# Patient Record
Sex: MALE | Race: WHITE | NOT HISPANIC OR LATINO | Employment: OTHER | URBAN - METROPOLITAN AREA
[De-identification: names, ages, dates, MRNs, and addresses within clinical notes are randomized per-mention and may not be internally consistent; named-entity substitution may affect disease eponyms.]

---

## 2022-08-04 ENCOUNTER — TELEPHONE (OUTPATIENT)
Dept: UROLOGY | Facility: CLINIC | Age: 67
End: 2022-08-04
Payer: COMMERCIAL

## 2022-08-04 ENCOUNTER — TELEPHONE (OUTPATIENT)
Dept: HEMATOLOGY/ONCOLOGY | Facility: CLINIC | Age: 67
End: 2022-08-04
Payer: COMMERCIAL

## 2022-08-04 NOTE — TELEPHONE ENCOUNTER
RN Rush restricted from scheduling on Dr Rockwell's schedule, Message sent to staff for assistance      ----- Message from Diallo Telles RN sent at 8/4/2022 11:20 AM CDT -----  Regarding: FW: Appt  Contact: pt @ [phone]  I think you're Crista this week.  Let me know if I need to send this somewhere else.  Thanks  ----- Message -----  From: Flavio Dillard LPN  Sent: 8/4/2022   9:46 AM CDT  To: Diallo Telles RN  Subject: FW: Eliz                                           ----- Message -----  From: Ariane Pfeiffer  Sent: 8/4/2022   9:11 AM CDT  To: [facility] Staff  Subject: Appt                                             Pt was diagnosed with prostrate cancer, in need of an urgent appt. Asking for a call back asap

## 2022-08-04 NOTE — TELEPHONE ENCOUNTER
----- Message from Karen Borrero RN sent at 8/4/2022  1:20 PM CDT -----  Regarding: FW: Appt  Contact: pt @ [phone]  [facility] staff,    New patient seeking 2nd opinion with Dr Rockwell, possible discussion for [facility]. Unfortunately, I am unable to lift the restrictions on Dr Rockwell's schedule and [facility] is blocking me from making an appointment. Would anyone be able to assist, please?   Im looking at Aug 23 and Aug 24    Patient hoping to hear back today    Thank you,  Karen bone marrow RN Rush  ----- Message -----  From: Diallo Telles RN  Sent: 8/4/2022  11:21 AM CDT  To: Karen Borrero RN  Subject: FW: Appt                                         I think you're Crista this week.  Let me know if I need to send this somewhere else.  Thanks  ----- Message -----  From: Flavio Dillard LPN  Sent: 8/4/2022   9:46 AM CDT  To: Diallo Telles RN  Subject: FW: Appt                                           ----- Message -----  From: Ariane Pfeiffer  Sent: 8/4/2022   9:11 AM CDT  To: [facility] Staff  Subject: Appt                                             Pt was diagnosed with prostrate cancer, in need of an urgent appt. Asking for a call back asap

## 2022-08-04 NOTE — TELEPHONE ENCOUNTER
Attempted to contact left message with date and time for appointment.  Clinic number given for any questions.  maryjo hall lpn

## 2022-08-23 ENCOUNTER — TELEPHONE (OUTPATIENT)
Dept: RADIOLOGY | Facility: HOSPITAL | Age: 67
End: 2022-08-23
Payer: COMMERCIAL

## 2022-08-23 ENCOUNTER — OFFICE VISIT (OUTPATIENT)
Dept: UROLOGY | Facility: CLINIC | Age: 67
End: 2022-08-23
Payer: MEDICARE

## 2022-08-23 VITALS
WEIGHT: 167.75 LBS | DIASTOLIC BLOOD PRESSURE: 80 MMHG | HEIGHT: 71 IN | BODY MASS INDEX: 23.48 KG/M2 | SYSTOLIC BLOOD PRESSURE: 139 MMHG | HEART RATE: 66 BPM

## 2022-08-23 DIAGNOSIS — C61 PROSTATE CANCER: Primary | ICD-10-CM

## 2022-08-23 PROCEDURE — 99999 PR PBB SHADOW E&M-EST. PATIENT-LVL III: ICD-10-PCS | Mod: PBBFAC,,, | Performed by: UROLOGY

## 2022-08-23 PROCEDURE — 99205 PR OFFICE/OUTPT VISIT, NEW, LEVL V, 60-74 MIN: ICD-10-PCS | Mod: S$PBB,,, | Performed by: UROLOGY

## 2022-08-23 PROCEDURE — 99213 OFFICE O/P EST LOW 20 MIN: CPT | Mod: PBBFAC | Performed by: UROLOGY

## 2022-08-23 PROCEDURE — 99205 OFFICE O/P NEW HI 60 MIN: CPT | Mod: S$PBB,,, | Performed by: UROLOGY

## 2022-08-23 PROCEDURE — 99999 PR PBB SHADOW E&M-EST. PATIENT-LVL III: CPT | Mod: PBBFAC,,, | Performed by: UROLOGY

## 2022-08-23 RX ORDER — OMEPRAZOLE 40 MG/1
40 CAPSULE, DELAYED RELEASE ORAL
COMMUNITY
Start: 2022-08-12 | End: 2022-08-23

## 2022-08-23 RX ORDER — OMEPRAZOLE 40 MG/1
40 CAPSULE, DELAYED RELEASE ORAL DAILY
COMMUNITY
Start: 2022-08-12

## 2022-08-23 RX ORDER — CETIRIZINE HYDROCHLORIDE 10 MG/1
10 TABLET ORAL
COMMUNITY

## 2022-08-23 RX ORDER — CIPROFLOXACIN 500 MG/1
500 TABLET ORAL 2 TIMES DAILY
COMMUNITY
Start: 2022-03-08

## 2022-08-23 NOTE — LETTER
August 23, 2022        [place], [place]  [id number] [place] [address] Dr Ness [address] [address]             [facility] - Urology 2nd Fl  [address]  [facility] [address]  Phone: [phone]   Patient: Bebeto Lou   MR Number: [id number]   YOB: 1955   Date of Visit: 8/23/2022       Dear Dr. Husain:    Thank you for referring Bebeto Lou to me for evaluation. Attached you will find relevant portions of my assessment and plan of care.    If you have questions, please do not hesitate to call me. I look forward to following Bebeto Lou along with you.    Sincerely,      Wencselao Rockwell MD            CC    No Recipients    Enclosure

## 2022-08-23 NOTE — PROGRESS NOTES
Clinic Note  8/23/2022      Subjective:         Chief Complaint:   HPI  Bebeto Lou is a 67 y.o. male  Diagnosed with prostate cancer.  Here with wife Jenny, friend [place]. Residential contractor. 2 boys, 2 girls...    FH -negative  PSA - 7.55  AJCC Stage - T3b  STAR CAP stage- III3A  Volume - 36 ccs  MRI - 6/14/2022- 2.1 cm PI-RADS 5 lesion LBPZ. Positive for left SVI (seminal vesicle involvement) , left extra prostatic extension,and left NVBI (neurovascular bundle involvement). Negative nodes.Report only.  Biopsy - 7/22/2022- [place] 4+3 (GG3) left middle(target) 4/4 30%, left base 30%; [place] 6 left lateral apex 10%, left lateral mid 30%, left lateral base. Overall 5/13 sites  JAY Score - 7 high risk  NCCN - high risk  Germline testing -indicated  Somatic testing -       No results found for: PSA, PSADIAG, PSATOTAL, PSAFREE, PSAFREEPCT   No past medical history on file.  No family history on file.  Social History     Socioeconomic History    Marital status:      No past surgical history on file.  Patient Active Problem List   Diagnosis    Prostate cancer     Review of Systems   Constitutional: Negative for appetite change, chills, fatigue, fever and unexpected weight change.   HENT: Negative for nosebleeds.    Respiratory: Negative for shortness of breath and wheezing.    Cardiovascular: Negative for chest pain, palpitations and leg swelling.   Gastrointestinal: Negative for abdominal distention, abdominal pain, constipation, diarrhea, nausea and vomiting.   Musculoskeletal: Negative for arthralgias and back pain.   Skin: Negative for pallor.   Neurological: Negative for dizziness, seizures and syncope.   Hematological: Negative for adenopathy.   Psychiatric/Behavioral: Negative for dysphoric mood.         Objective:      There were no vitals taken for this visit.  There is no height or weight on file to calculate BMI.  Physical Exam  Constitutional:       General: He is not in acute distress.      Appearance: He is well-developed. He is not diaphoretic.   HENT:      Head: Atraumatic.   Neck:      Trachea: No tracheal deviation.   Cardiovascular:      Rate and Rhythm: Normal rate.   Pulmonary:      Effort: Pulmonary effort is normal. No respiratory distress.      Breath sounds: No wheezing.   Abdominal:      General: Bowel sounds are normal. There is no distension.      Palpations: Abdomen is soft. There is no mass.      Tenderness: There is no abdominal tenderness. There is no guarding or rebound.   Skin:     General: Skin is warm and dry.   Neurological:      Mental Status: He is alert and oriented to person, place, and time.   Psychiatric:         Behavior: Behavior normal.         Thought Content: Thought content normal.         Judgment: Judgment normal.           Assessment and Plan:           Problem List Items Addressed This Visit     Prostate cancer - Primary          Follow up:   Today's visit was spent almost entirely on counseling. We reviewed his diagnosis, stage, grade, risk group, and prognosis. We discussed [place] (NCCN) and JAY risk stratification  We discussed the concept of low risk, intermediate risk, and high risk disease. We also reviewed the NCCN treatment nomogram.We discussed the different treatment options including active surveillance (as well as the surveillance protocol), prostate brachytherapy, EBRT, SBRT (stereotactic body radiation therapy),cryotherapy, HIFU and both open and robotic prostatectomy.We also discussed the advantages, disadvantages, risks and benefits, as well as complications of each option. Regarding radiation therapy we discussed treatment planning, the different techniques, short and long term complications. These included radiation cystitis, radiation proctitis, and impotence. We discussed success, failure, and salvage therapeutic options.Also discussed the use of SpaceOAR for brachytherapy and EBRT and fiducials for EBRT.   We discussed surgical therapy in  depth including preoperative preparation, surgical technique (including bladder neck and nerve-sparing techniques), postoperative recuperation and recovery, and short and long term complications including UTI, bleeding, blood clots,catheter dislodgement, etc. We discussed the risks of reoperation, incontinence, impotence, and recurrence. We discussed preop and postop Kegels, post op penile rehab, and treatment options for incontinence and impotence. We discussed rates of cancer free survival and recurrence, as well as salvage therapeutic options. We discussed the possible  indications for adjuvant radiation therapy.   I answered questions and addressed concerns. Also discussed the [employer] test to get genetic information about this tumors potential    Discussed that with high risk tumor germline testing and additional staging are indicated. Recommend PSMA scan for staging.  PSMA scan  Yannick Bernal consult  [employer].  Consult Dr. Husain.  Wenceslao Rockwell

## 2022-08-24 ENCOUNTER — TELEPHONE (OUTPATIENT)
Dept: RADIOLOGY | Facility: HOSPITAL | Age: 67
End: 2022-08-24
Payer: COMMERCIAL

## 2022-08-24 ENCOUNTER — DOCUMENTATION ONLY (OUTPATIENT)
Dept: GENETICS | Facility: CLINIC | Age: 67
End: 2022-08-24
Payer: COMMERCIAL

## 2022-08-24 NOTE — PROGRESS NOTES
Genetic Referral Nurse Note    Nurse reached out to pt to set up genetic apt, pt was aware of referral, apt scheduled per pt date/time/location. Pt stated if he can not keep the apt, he will call to r/s

## 2022-08-30 ENCOUNTER — HOSPITAL ENCOUNTER (OUTPATIENT)
Dept: RADIOLOGY | Facility: HOSPITAL | Age: 67
Discharge: HOME | End: 2022-08-30
Attending: UROLOGY
Payer: MEDICARE

## 2022-08-30 DIAGNOSIS — C61 PROSTATE CANCER: ICD-10-CM

## 2022-08-30 PROCEDURE — 78815 NM PET CT F 18 PYL PSMA, MIDTHIGH TO VERTEX: ICD-10-PCS | Mod: 26,PI,, | Performed by: RADIOLOGY

## 2022-08-30 PROCEDURE — 78815 PET IMAGE W/CT SKULL-THIGH: CPT | Mod: TC

## 2022-08-30 PROCEDURE — 78815 PET IMAGE W/CT SKULL-THIGH: CPT | Mod: 26,PI,, | Performed by: RADIOLOGY

## 2022-09-01 ENCOUNTER — OFFICE VISIT (OUTPATIENT)
Dept: UROLOGY | Facility: CLINIC | Age: 67
End: 2022-09-01
Payer: MEDICARE

## 2022-09-01 VITALS
SYSTOLIC BLOOD PRESSURE: 153 MMHG | BODY MASS INDEX: 25.14 KG/M2 | HEART RATE: 68 BPM | HEIGHT: 71 IN | DIASTOLIC BLOOD PRESSURE: 78 MMHG | WEIGHT: 179.56 LBS

## 2022-09-01 DIAGNOSIS — C61 PROSTATE CANCER: Primary | ICD-10-CM

## 2022-09-01 PROCEDURE — 99999 PR PBB SHADOW E&M-EST. PATIENT-LVL III: ICD-10-PCS | Mod: PBBFAC,,, | Performed by: UROLOGY

## 2022-09-01 PROCEDURE — 99999 PR PBB SHADOW E&M-EST. PATIENT-LVL III: CPT | Mod: PBBFAC,,, | Performed by: UROLOGY

## 2022-09-01 PROCEDURE — 99215 OFFICE O/P EST HI 40 MIN: CPT | Mod: S$PBB,,, | Performed by: UROLOGY

## 2022-09-01 PROCEDURE — 99215 PR OFFICE/OUTPT VISIT, EST, LEVL V, 40-54 MIN: ICD-10-PCS | Mod: S$PBB,,, | Performed by: UROLOGY

## 2022-09-01 PROCEDURE — 99213 OFFICE O/P EST LOW 20 MIN: CPT | Mod: PBBFAC | Performed by: UROLOGY

## 2022-09-01 NOTE — PROGRESS NOTES
"Clinic Note  9/1/2022      Subjective:         Chief Complaint:   SAE West is a 67 y.o. maleDiagnosed with prostate cancer.  Here with wife Jenny, friend [place]. Residential contractor. 2 boys, 2 girls...     FH -negative  PSA - 7.55  AJCC Stage - T3b  STAR CAP stage- III3A  Volume - 36 ccs  MRI - 6/14/2022- 2.1 cm PI-RADS 5 lesion LBPZ. Positive for left SVI (seminal vesicle involvement) , left extra prostatic extension,and left NVBI (neurovascular bundle involvement). Negative nodes.Report only.  Biopsy - 7/22/2022- [place] 4+3 (GG3) left middle(target) 4/4 30%, left base 30%; [place] 6 left lateral apex 10%, left lateral mid 30%, left lateral base. Overall 5/13 sites  JAY Score - 7 high risk  NCCN - high risk. Very high risk per MRI.   Germline testing -indicated  Somatic testing -     [facility] [place] involvement: 40%    9/1/2022- PSMA scan 8/30/2022- No avid metastatic or alexia lesions. Scans shows uptake on left side of prostate and involvement of left seminal vesicle.  Has appointment with Dr. Husain tomorrow.  PSMA-        No results found for: PSA, PSADIAG, PSATOTAL, PSAFREE, PSAFREEPCT   Past Medical History:   Diagnosis Date    Allergy      No family history on file.  Social History     Socioeconomic History    Marital status:    Tobacco Use    Smoking status: Never    Smokeless tobacco: Never   Substance and Sexual Activity    Alcohol use: Yes    Drug use: Never    Sexual activity: Yes     No past surgical history on file.  Patient Active Problem List   Diagnosis    Prostate cancer     Review of Systems      Objective:      There were no vitals taken for this visit.  Estimated body mass index is 23.4 kg/m² as calculated from the following:    Height as of 8/23/22: 5' 11" (1.803 m).    Weight as of 8/23/22: 76.1 kg (167 lb 12.3 oz).  Physical Exam      Assessment and Plan:           Problem List Items Addressed This Visit       Prostate cancer - Primary       Follow up: "   Today's visit was spent almost entirely on counseling. We reviewed his diagnosis, stage, grade, risk group, and prognosis. We discussed [place] (NCCN) and JAY risk stratification  We discussed the concept of low risk, intermediate risk, and high risk disease. We also reviewed the NCCN treatment nomogram.We discussed the different treatment options including active surveillance (as well as the surveillance protocol), prostate brachytherapy, EBRT, SBRT (stereotactic body radiation therapy),cryotherapy, HIFU and both open and robotic prostatectomy.We also discussed the advantages, disadvantages, risks and benefits, as well as complications of each option. Regarding radiation therapy we discussed treatment planning, the different techniques, short and long term complications. These included radiation cystitis, radiation proctitis, and impotence. We discussed success, failure, and salvage therapeutic options.Also discussed the use of SpaceOAR for brachytherapy and EBRT and fiducials for EBRT.   We discussed surgical therapy in depth including preoperative preparation, surgical technique (including bladder neck and nerve-sparing techniques), postoperative recuperation and recovery, and short and long term complications including UTI, bleeding, blood clots,catheter dislodgement, etc. We discussed the risks of reoperation, incontinence, impotence, and recurrence. We discussed preop and postop Kegels, post op penile rehab, and treatment options for incontinence and impotence. We discussed rates of cancer free survival and recurrence, as well as salvage therapeutic options. We discussed the possible  indications for adjuvant radiation therapy.   I answered questions and addressed concerns. Also discussed the [employer] test to get genetic information about this tumors potential   MRI and PSMA both consistent with T3b disease. Will discuss with Dr Husain.            Wenceslao Rockwell

## 2022-09-01 NOTE — LETTER
September 1, 2022        [place] III, [place]  [id number] [place] [address] Dr Ness [address] [address]             [facility] - Urology 2nd Fl  [address]  [facility] [address]  Phone: [phone]   Patient: Bebeto West   MR Number: [id number]   YOB: 1955   Date of Visit: 9/1/2022       Dear Dr. Husain:    Thank you for referring Bebeto West to me for evaluation. Attached you will find relevant portions of my assessment and plan of care.    If you have questions, please do not hesitate to call me. I look forward to following Bebeto West along with you.    Sincerely,      Wenceslao Rockwell MD            CC    No Recipients    Enclosure

## 2022-09-02 ENCOUNTER — OFFICE VISIT (OUTPATIENT)
Dept: RADIATION ONCOLOGY | Facility: CLINIC | Age: 67
End: 2022-09-02
Payer: MEDICARE

## 2022-09-02 VITALS
OXYGEN SATURATION: 98 % | HEART RATE: 60 BPM | RESPIRATION RATE: 18 BRPM | TEMPERATURE: 97 F | BODY MASS INDEX: 25.13 KG/M2 | DIASTOLIC BLOOD PRESSURE: 81 MMHG | HEIGHT: 71 IN | SYSTOLIC BLOOD PRESSURE: 142 MMHG | WEIGHT: 179.5 LBS

## 2022-09-02 DIAGNOSIS — C61 PROSTATE CANCER: Primary | ICD-10-CM

## 2022-09-02 PROCEDURE — 99417 PROLNG OP E/M EACH 15 MIN: CPT | Mod: S$PBB,,, | Performed by: RADIOLOGY

## 2022-09-02 PROCEDURE — 99999 PR PBB SHADOW E&M-EST. PATIENT-LVL III: ICD-10-PCS | Mod: PBBFAC,,, | Performed by: RADIOLOGY

## 2022-09-02 PROCEDURE — 99213 OFFICE O/P EST LOW 20 MIN: CPT | Mod: PBBFAC | Performed by: RADIOLOGY

## 2022-09-02 PROCEDURE — 99205 OFFICE O/P NEW HI 60 MIN: CPT | Mod: S$PBB,,, | Performed by: RADIOLOGY

## 2022-09-02 PROCEDURE — 99999 PR PBB SHADOW E&M-EST. PATIENT-LVL III: CPT | Mod: PBBFAC,,, | Performed by: RADIOLOGY

## 2022-09-02 PROCEDURE — 99205 PR OFFICE/OUTPT VISIT, NEW, LEVL V, 60-74 MIN: ICD-10-PCS | Mod: S$PBB,,, | Performed by: RADIOLOGY

## 2022-09-02 PROCEDURE — 99417 PR PROLONGED SVC, OUTPT, W/WO DIRECT PT CONTACT,  EA ADDTL 15 MIN: ICD-10-PCS | Mod: S$PBB,,, | Performed by: RADIOLOGY

## 2022-09-02 NOTE — PROGRESS NOTES
[facility] [place]  RADIATION ONCOLOGY CONSULTATION    Name: Bebeto West  : 1955      Patient Referred To Radiation Oncology By:  Dr. Wenceslao Rockwell MD  [address] [place] [address]    DIAGNOSIS: unfavorable intermediate risk prostate cancer, [place] 4+3=7, PSA 9.3, [id number]    HISTORY OF PRESENT ILLNESS:  Bebeto West is a 67 y.o. male who presents for consultation for the above diagnosis.   PSA up to 7.55 recently, was 9.3 nearly one year ago.    Outside MRI 22 showed 36cc gland, PIRADS 5 lesion left side with SVI, KYRIE and NV bundle involvement.     Outside biopsy pathology showed adenocarcinoma, [place] 4+3=7, overall 5/13 cores.    PSMA PET showed nodular focus of avidity in L posterior prostate involving L SV and NV bundle.    Today, he is doing well overall.  He is not taking any bladder medications.  Denies diarrhea, constipation, bloody stools.   Denies dysuria, hematuria.  Strongly considering surgery with Dr. Bai     REVIEW OF SYSTEMS: (Positive findings bold, otherwise negative)   Constitutional: fever, fatigue, weight change  Eyes: blurred vision in the past 3 months, double vision   ENT: ear pain, new mouth lesions, jaw pain, difficulty swallowing, sore throat  Cardiovascular: chest pain on exertion, reflux, leg swelling  Respiratory: shortness of breath, dyspnea, cough, hemoptysis.   GI: abdominal pain, diarrhea, constipation, blood in stool, painful bowel movements  : painful or burning urination, blood in urine  Musculoskeletal: new bone or joint pains  Neurologic: headache, seizure, focal numbness or tingling, balance changes, speech changes  Lymph: new or enlarged lymph nodes  Psychiatric: depression, anxiety    PRIOR RADIATION HISTORY: none    PAST MEDICAL HISTORY:  Past Medical History:   Diagnosis Date    Allergy        PAST SURGICAL HISTORY:  No past surgical history on file.    ALLERGIES:   Review of patient's allergies  "indicates:   Allergen Reactions    Cat hair standardized allergenic extract        MEDICATIONS:    Current Outpatient Medications:     cetirizine (ZYRTEC) 10 MG tablet, Take 10 mg by mouth., Disp: , Rfl:     ciprofloxacin HCl (CIPRO) 500 MG tablet, Take 500 mg by mouth 2 (two) times daily., Disp: , Rfl:     omeprazole (PRILOSEC) 40 MG capsule, Take 40 mg by mouth once daily., Disp: , Rfl:     SOCIAL HISTORY:  Social History     Socioeconomic History    Marital status:    Tobacco Use    Smoking status: Never    Smokeless tobacco: Never   Substance and Sexual Activity    Alcohol use: Yes    Drug use: Never    Sexual activity: Yes     Lives in     FAMILY HISTORY:  No family history on file.    PHYSICAL EXAMINATION:  Constitutional: well appearing, no acute distress, ECOG 0 - Fully Active  Vitals:    BP (!) 142/81   Pulse 60   Temp 97.4 °F (36.3 °C)   Resp 18   Ht 5' 11" (1.803 m)   Wt 81.4 kg (179 lb 8 oz)   SpO2 98%   BMI 25.04 kg/m²   Eyes: sclera anicteric, EOMI, pupils equal, round and reactive to light  ENT: oral cavity without lesions, moist mucous membranes  Neck: trachea midline, neck supple  Lymphatic: no cervical, supraclavicular or axillary adenopathy  Cardiovascular: regular rate, no edema of the upper or lower extremities, radial pulse 2+  Respiratory: unlabored effort, clear to auscultation, no wheezes  Abdomen: soft, non-tender, no rigidity, no masses, no hepatomegaly  Rectal: deferred  Neuro: Cranial nerves III-XII intact, speech not slurred, gait non-ataxic, no dysdiadochokinesia, strength 5/5 upper and lower extremities  Spine: non-tender to percussion cervical, thoracic and lumbosacral spine    IMAGING AND LABORATORY FINDINGS: As per HPI; images reviewed personally.    ASSESSMENT: 67 y.o. male with unfav intermed risk prostate cancer    PLAN: Mr. West has a unfavorable intermediate risk prostate cancer. His treatment options include prostatectomy or radiation therapy with androgen " deprivation therapy.   Surgery for him given MRI findings; would almost certainly require salvage radiation.    For unfavorable intermediate risk prostate cancer, radiation treatment typically consists of hypofractionated (70Gy/28fx) external beam radiation likely with 6 months of ADT.  ADT is given prior to starting radiation and continued adjuvantly.   Could consider Decipher (would have to be ordered by urology given I do not have pathology at [facility]) to determine if he should have longer than 6mo of ADT.    Potential acute toxicities include fatigue, nausea, and bowel/bladder irritation. Potential late toxicities include bowel/bladder irritation, erectile dysfunction, damage to bowel/bladder, and very low risk of secondary malignancy.     After this discussion regarding the techniques, toxicities and indications of radiation, I answered the patient's questions to their apparent satisfaction.     I spent approximately 100 minutes reviewing the available records and evaluating the patient, out of which over 50% of the time was spent face to face with the patient in counseling and coordinating this patient's care.    Shawn Husain III, M.D.  Radiation Oncology  [facility] [address] [place] [address]  Ph: [phone]  [email or web address]

## 2022-09-06 ENCOUNTER — OFFICE VISIT (OUTPATIENT)
Dept: GENETICS | Facility: CLINIC | Age: 67
End: 2022-09-06
Payer: MEDICARE

## 2022-09-06 ENCOUNTER — LAB VISIT (OUTPATIENT)
Dept: LAB | Facility: HOSPITAL | Age: 67
End: 2022-09-06
Attending: UROLOGY
Payer: MEDICARE

## 2022-09-06 DIAGNOSIS — C61 PROSTATE CANCER: ICD-10-CM

## 2022-09-06 LAB
GENETIC COUNSELING?: NORMAL
GENSO SPECIMEN TYPE: NORMAL
PARTENTAL OR SIBLING TESTING?: NORMAL

## 2022-09-06 PROCEDURE — 96040 PR GENETIC COUNSELING, EACH 30 MIN: CPT | Mod: ,,, | Performed by: UROLOGY

## 2022-09-06 PROCEDURE — 36415 COLL VENOUS BLD VENIPUNCTURE: CPT | Performed by: UROLOGY

## 2022-09-06 PROCEDURE — 99999 PR PBB SHADOW E&M-EST. PATIENT-LVL I: CPT | Mod: PBBFAC,,,

## 2022-09-06 PROCEDURE — 99499 NO LOS: ICD-10-PCS | Mod: S$PBB,,, | Performed by: UROLOGY

## 2022-09-06 PROCEDURE — 99999 PR PBB SHADOW E&M-EST. PATIENT-LVL I: ICD-10-PCS | Mod: PBBFAC,,,

## 2022-09-06 PROCEDURE — 99499 UNLISTED E&M SERVICE: CPT | Mod: S$PBB,,, | Performed by: UROLOGY

## 2022-09-06 PROCEDURE — 99211 OFF/OP EST MAY X REQ PHY/QHP: CPT | Mod: PBBFAC

## 2022-09-06 PROCEDURE — 96040 PR GENETIC COUNSELING, EACH 30 MIN: ICD-10-PCS | Mod: ,,, | Performed by: UROLOGY

## 2022-09-06 NOTE — PROGRESS NOTES
"    Cancer Genetics  Hereditary/High Risk Clinic  Department of Hematology and Oncology  [facility]         Date of Service:  2022  Provider:  Anil Mckeon MS, GC    Patient Information  Name:  Bebeto West  :  1955  MRN:  [id number]        Referring Provider: Wenceslao Rockwell MD    SUBJECTIVE:      Chief Complaint: Genetic Counseling    History of Present Illness (HPI):  Bebeto West ("Newsudheer"), 67 y.o., assigned male sex at birth is new to the [facility] of [facility] and to me.  He was referred by  Urology  for genetic cancer risk assessment given his personal history of prostate cancer with a [place] score of 7. At age 67, Christiane was diagnosed with adenocarcinoma of the prostate ([place] 3+4, PSA 7.55, T3b, Stage III3A). Christiane reported that he plans to undergo radiation therapy, no plan for surgery per Christiane.    Focused Medical History:   Germline cancer-genetic testing:  No  Cancer:  Yes - prostate (2022)  Colon polyp:  Yes   Most recent colonoscopy: 6 years ago, told to repeat in 10 years  Other benign tumor:  No  Pancreatitis:  No  Pancreatic cyst:  No    Ancestry:  Ashkenazi [place] ancestry:  No  Paternal: English  Maternal: English, Malay    Focused Family History:  Consanguinity in ancestors:  No  Germline cancer-genetic testing in blood relatives:  No  Cancer in family:  Yes; there are no known blood relatives affected with cancer other than those mentioned in the pedigree below    Family Cancer Pedigree:              Review of Systems:  See HPI.        SUBJECTIVE:   Records Reviewed  Medical History  Problem List  Any pertinent, available Procedures and Pathology reports in both [facility] and [facility]    COUNSELING   Causes of cancer  Germline cancer genetic testing is the testing of genes associated with cancer, known as cancer susceptibility genes.  Just as these genes are inherited from parents, mutations in these genes can " be inherited, as well.  A mutation in a cancer susceptibility gene adversely affects the gene's ability to prevent cancer; therefore, carriers of cancer susceptibility gene mutations may be at increased risk for certain cancers.     Only 5-10% cancers are caused by a cancer susceptibility gene mutation, meaning the cancer is genetic/hereditary; rather, most cancers are sporadic.  Causes of sporadic cancers may include environmental risk factors, lifestyle risk factors, and non-modifiable risk factors.  It is important to note that members of a family often share not only their genetics but also risk factors including environmental and lifestyle risk factors, so cancers can be familial.     Potential results of genetic testing, and their implications  Potential results of genetic testing include positive, negative, and variant of unknown significance (VUS).    A positive result indicates the presence of at least one clinically significant mutation, and the patient's associated cancer risks vary depending upon the cancer susceptibility gene(s) in which there is/are a mutation(s).  With a positive result, in some cases, depending upon the specific result and the patient's clinical history, modified risk management may be recommended, including measures for risk reduction and/or surveillance; however, modified management is not always an option.    A negative result indicates that no clinically significant mutations were identified in the gene(s) tested.    A VUS indicates that there is not presently enough data for the laboratory to make a determination as to whether the variant is clinically significant.  VUSs are not typically acted upon clinically.       Modified management may also be recommended, even with a result of no or unknown significance, based upon risk assessment that incorporates the family history.  Sometimes, depending upon the genetic testing result and the cancer diagnosis, additional/modified  treatments may be an option, though this is not guaranteed.     Genetic mutation inheritance  If Christiane tests positive for a mutation, his first-degree relatives would each have a 50% chance of having the same mutation, and other, more distantly related blood-relatives would also be at risk of having the same mutation.       Genetic discrimination  The Genetic Information Nondiscrimination Act (TORY) is [place] federal legislation that provides some protections against use of an individual's genetic information by their health insurer and by their employer.  Title I of TORY prohibits most health insurers from utilizing an individual's genetic information to make decisions regarding insurance eligibility or premium charges.  Title II of TORY prohibits covered entities, including employers, from requesting the genetic information of employees and applicants.  TORY does not protect individuals from genetic discrimination toward health insurance obtained through a job with the  or through the Federal Employees Health Benefits Plan; from genetic discrimination by employers with fewer than 15 employees or if employed by the ; or from genetic discrimination by any other type of policies/entities, including but not limited to life insurance, disability insurance, long-term care insurance,  benefits, and  Health Services benefits.     Genetic testing logistics  An outside laboratory would perform the testing after a blood sample is collected here at [facility] or a saliva sample is collected by the patient at home.  With genetic testing, there is a potential for the patient to incur out-of-pocket costs.  Results can take 2-3 weeks.  Post-test genetic counseling can be conducted once the genetic testing results are available.     Assessment/Plan  Based on the information provided by Christiane, he meets current criteria for genetic testing of according to current clinical guidelines. Christiane's clinical history,  including personal history and/or family history, is suggestive of a hereditary cancer syndrome in the family given the personal history of prostate cancer with Josiane score of 7.     Offered germline cancer-genetic testing at this time versus deferring testing at this time or declining testing altogether.  He desires to proceed with germline cancer-genetic testing at this time and has provided his informed consent to proceed. Various test panel options were discussed. Christiane decided to proceed with genetic testing through the 47-gene [employer] Common Hereditary Cancer Panel (APC, HARJIT, AXIN2, BARD1, BMPR1A, BRCA1, BRCA2, BRIP1, CDH1, CDK4, CDKN2A, CHEK2, CTNNA1, DICER1, EPCAM, GREM1, HOXB13, KIT, MEN1, MLH1, MSH2, MSH3, MSH6, MUTYH, NBN, NF1, NTHL1, PALB2, PDGFRA, PMS2, POLD1, POLE, PTEN, RAD50, RAD51C, RAD51D, SDHA, SDHB, SDHC, SDHD, SMAD4, SMARCA4, STK11, TP53, TSC1, TSC2, VHL). A blood sample was collected.    Questions were encouraged and answered to the patient's satisfaction, and she verbalized understanding of information and agreement with the plan.         Signed,    Anil Mckeon MS, [place]  Genetic Counselor, Hereditary and High-Risk Clinic  Hematology/Oncology, [facility]    09/06/2022

## 2022-09-23 ENCOUNTER — TELEPHONE (OUTPATIENT)
Dept: GENETICS | Facility: CLINIC | Age: 67
End: 2022-09-23
Payer: COMMERCIAL

## 2022-09-23 NOTE — TELEPHONE ENCOUNTER
Contacted patient to disclose positive genetic testing results. 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 Unfortunately, this does not provide a hereditary explanation for his personal history of prostate cancer. Explained the cancer risks, inheritance, and screening. Also explained that he does not have MUTYH-associated polyposis (MAP). Explained the risk for his children to be a carrier vs having MAP. GC answered addressed questions. Results were released in [employer] portal.    Offered a follow-up appointment, patient declined at this time. Will plan to have a future appointment with wife if/when her genetic testing is completed.

## 2023-05-23 ENCOUNTER — PATIENT MESSAGE (OUTPATIENT)
Dept: RESEARCH | Facility: HOSPITAL | Age: 68
End: 2023-05-23
Payer: COMMERCIAL